# Patient Record
Sex: MALE | Race: WHITE | Employment: FULL TIME | URBAN - METROPOLITAN AREA
[De-identification: names, ages, dates, MRNs, and addresses within clinical notes are randomized per-mention and may not be internally consistent; named-entity substitution may affect disease eponyms.]

---

## 2017-02-23 ENCOUNTER — TRANSFERRED RECORDS (OUTPATIENT)
Dept: HEALTH INFORMATION MANAGEMENT | Facility: CLINIC | Age: 27
End: 2017-02-23

## 2017-02-23 ENCOUNTER — MEDICAL CORRESPONDENCE (OUTPATIENT)
Dept: HEALTH INFORMATION MANAGEMENT | Facility: CLINIC | Age: 27
End: 2017-02-23

## 2017-04-24 ENCOUNTER — MEDICAL CORRESPONDENCE (OUTPATIENT)
Dept: HEALTH INFORMATION MANAGEMENT | Facility: CLINIC | Age: 27
End: 2017-04-24

## 2017-06-12 ENCOUNTER — PRE VISIT (OUTPATIENT)
Dept: NEUROLOGY | Facility: CLINIC | Age: 27
End: 2017-06-12

## 2017-06-12 NOTE — TELEPHONE ENCOUNTER
1.  Date/reason for appt: 6/15/17, L sided cutaneous femoral paresthesia   2.  Referring provider: SAMANTHA KIRAN  3.  Call to patient (Yes / No - short description): No, referred   4.  Previous care at / records requested from:   [facility]- records scanned into epic.

## 2017-06-15 ENCOUNTER — OFFICE VISIT (OUTPATIENT)
Dept: NEUROLOGY | Facility: CLINIC | Age: 27
End: 2017-06-15

## 2017-06-15 VITALS
BODY MASS INDEX: 22.26 KG/M2 | SYSTOLIC BLOOD PRESSURE: 128 MMHG | RESPIRATION RATE: 16 BRPM | HEIGHT: 73 IN | WEIGHT: 168 LBS | OXYGEN SATURATION: 95 % | HEART RATE: 91 BPM | TEMPERATURE: 98.4 F | DIASTOLIC BLOOD PRESSURE: 82 MMHG

## 2017-06-15 DIAGNOSIS — G57.11 MERALGIA PARESTHETICA OF RIGHT SIDE: Primary | ICD-10-CM

## 2017-06-15 ASSESSMENT — PAIN SCALES - GENERAL: PAINLEVEL: NO PAIN (0)

## 2017-06-15 NOTE — MR AVS SNAPSHOT
"              After Visit Summary   6/15/2017    Brandon Kam    MRN: [id number]           Patient Information     Date Of Birth          1990        Visit Information        Provider Department      6/15/2017 2:25 PM Brandon Luna MD [facility] Neurology        Today's Diagnoses     Meralgia paresthetica of right side    -  1       Follow-ups after your visit        Who to contact     Please call your clinic at [phone] to:    Ask questions about your health    Make or cancel appointments    Discuss your medicines    Learn about your test results    Speak to your doctor   If you have compliments or concerns about an experience at your clinic, or if you wish to file a complaint, please contact [facility] Patient Relations at [phone] or email us at [email or web address]         Additional Information About Your Visit        MyChart Information     Schedulicity is an electronic gateway that provides easy, online access to your medical records. With Schedulicity, you can request a clinic appointment, read your test results, renew a prescription or communicate with your care team.     To sign up for SETiTt visit the website at [email or web address]   You will be asked to enter the access code listed below, as well as some personal information. Please follow the directions to create your username and password.     Your access code is: [email or web address]  Expires: 3/30/2018 11:19 PM     Your access code will  in 90 days. If you need help or a new code, please contact your [facility] or call [phone] for assistance.        Care EveryWhere ID     This is your Care EveryWhere ID. This could be used by other organizations to access your [place] medical records  [id number]        Your Vitals Were     Pulse Temperature Respirations Height Pulse Oximetry BMI (Body Mass Index)    91 98.4  F (36.9  C) (Oral) 16 1.842 m (6' 0.5\") 95% 22.47 " kg/m2       Blood Pressure from Last 3 Encounters:   No data found for BP    Weight from Last 3 Encounters:   No data found for Wt              Today, you had the following     No orders found for display       Primary Care Provider Office Phone # Fax #    Carlos Rolon [phone]       [address] [address]        Equal Access to Services     [facility]: Hadii aad ku hadasho Soomaali, waaxda luqadaha, qaybta kaalmada adeegyada, waxay idiin hayaan adeeg kharash la'aan . So [facility] [phone].    ATENCIÓN: Si habla español, tiene a goins disposición servicios gratuitos de asistencia lingüística. Llame al [phone].    We comply with applicable federal civil rights laws and [place] laws. We do not discriminate on the basis of race, color, national origin, age, disability, sex, sexual orientation, or gender identity.            Thank you!     Thank you for choosing [facility] NEUROLOGY  for your care. Our goal is always to provide you with excellent care. Hearing back from our patients is one way we can continue to improve our services. Please take a few minutes to complete the written survey that you may receive in the mail after your visit with us. Thank you!             Your Updated Medication List - Protect others around you: Learn how to safely use, store and throw away your medicines at www.disposemymeds.org.      Notice  As of 6/15/2017 11:59 PM    You have not been prescribed any medications.

## 2017-06-15 NOTE — LETTER
6/15/2017       RE: Brandon Kam  [address] [address]     Dear Colleague,    Thank you for referring your patient, Brandon Kam, to the [facility] NEUROLOGY at [facility]. Please see a copy of my visit note below.    DEPARTMENT OF NEUROLOGY  Referral for: meralgia paresthetica  Patient Name:  Brandon Kam  MRN:  [id number]  :  1990  Date of Clinic Visit:  Larissa 15, 2017  Primary Care Provider:  Carlos Rolon  Provider Requesting Consult: [facility]    CHIEF COMPLAINT: meralgia paresthetica    HISTORY OF PRESENT ILLNESS:  Brandon Kam is a 27 year old male presenting with L thigh sensory changes.  Brandon first noticed numbness in the lateral aspect of the L thigh for past ~18 months that intermittently radiates down to knee. Around this time he had started increasing his hiking and backpacking activity where he would wear a framed backpack loaded with tens of pounds (he mentions up to 40 lbs) that also had a waist strap for extra support. He recalls wearing the waist strap so tightly that he eventually developed a small ulcerated wound on his anterior L thigh that is now well-healed. Initially there was no pain but occasionally now he will have some soreness after exertion. He also has occasional paresthesia in that area. He stopped wearing his backpack for a few weeks and felt that he may have been having some improvement in sensation, but he just went on a trip for 4 days up to [facility] and seems to have exacerbated his symptoms again.  His symptoms seem improved with better sleep. There is possibly an association with posture like prolonged sitting but not lying flat on his back or on his side. There is no associated weakness in his LLE. He has no history of trauma or surgery to that areas. He is not interested in pharmacological treatment at this time, would prefer conservative management.    ASSESSMENT AND PLAN:  Mr. Kam is  "a 27M presenting with meralgia paresthetica. Classic distribution with corroborating history and exam findings. Differential diagnosis is limited but includes lumbar radiculopathy. Radiculopathy usually presents with back pain and the sensory deficit area is not usually so demarcated. He lacks history and findings consistent with a herniation or cord compression syndrome. No prior trauma or surgery that would confound a medical diagnosis.    Plan:  - avoid long-term, heavy-weight bearing over waist area; ensure good fit of backpack and try to increase padding on the waist belt or taking more breaks if you need to wear the pack  - Brandon would like to avoid medications for now, but should he develop pain/paresthesia then we can use the following: Gabapentin, Carbamazepine, or Phenytoin in that order.  - no need for neurological follow-up    Patient was seen and discussed with Dr. Liang.    Brandon Luna MD  Neurology PGY2  [facility]  Pager: [phone]    PHYSICAL EXAMINATION:  Vitals: /82 (BP Location: Right arm, Patient Position: Chair, Cuff Size: Adult Large)  Pulse 91  Temp 98.4  F (36.9  C) (Oral)  Resp 16  Ht 1.842 m (6' 0.5\")  Wt 76.2 kg (168 lb)  SpO2 95%  BMI 22.47 kg/m2  General: sitting in chair, NAD  HEENT: normocephalic  Cardiac: normotensive  Pulmonary: non-labored on room air  Abdomen: soft, NT/ND  Extremities: no edema, warm to palpation  Skin: no rashes, no bruising/bleeding  Psych: cooperative, appropriate  Neuro:   Mental status: alert and oriented to person, place, and time; language is fluent with intact comprehension and naming   Cranial nerves: PERRL, EOMI with intact smooth pursuit, full visual fields, fundi within normal limits, no facial asymmetry or ptosis, facial sensation intact and symmetric, hearing intact to conversation, tongue and uvula are midline, no hypophonia, no dysarthria   Motor: No abnormal posture, tone, atrophy, or " movements/fasciculations.   RIGHT LEFT    5 5   Biceps 5 5   Triceps 5 5   Deltoid 5 5   Hip flexion 5 5   Knee extension 5 5   Knee flexion 5 5   Ankle flexion 5 5   Ankle extension 5 5    Reflexes: did not test Babinski. Did not test Concepcion.   RIGHT LEFT   Brachioradialis 2+ 2+   Biceps 2+ 2+   Patellar 2+ 2+   Achilles 2+ 2+    Sensory: Intact to light touch, pin prick, and temperature in all extremities without extinction except for a small ~3l8xwad area on the anteolateral aspect of the L thigh about midway to the knee that demonstrates mildly hypoesthetic LT sensation and mildly hyperesthetic pain/temperature.   Coordination: No dysmetria. No dysdiadochokinesia.   Gait: Normal width, stride length, turn, and arm swing.    INVESTIGATIONS:   None at this time    REVIEW OF SYSTEMS: 12-point RoS negative except as per HPI.    Allergies:  Allergies not on file     Current Medications:  No current outpatient prescriptions on file.     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     Social History Main Topics     Smoking status: Not on file     Smokeless tobacco: Not on file     Alcohol use Not on file     Drug use: Not on file     Sexual activity: Not on file     Other Topics Concern     Not on file     Social History Narrative       Again, thank you for allowing me to participate in the care of your patient.      Sincerely,    Brandon Luna MD

## 2017-06-15 NOTE — LETTER
6/15/2017      RE: Brandon Kam  [address] [address]         DEPARTMENT OF NEUROLOGY  Referral for: meralgia paresthetica  Patient Name:  Brandon Kam  MRN:  [id number]  :  1990  Date of Clinic Visit:  Larissa 15, 2017  Primary Care Provider:  Carlos Rolon  Provider Requesting Consult: [facility]    CHIEF COMPLAINT: meralgia paresthetica    HISTORY OF PRESENT ILLNESS:  Brandon Kam is a 27 year old male presenting with L thigh sensory changes.  Brandon first noticed numbness in the lateral aspect of the L thigh for past ~18 months that intermittently radiates down to knee. Around this time he had started increasing his hiking and backpacking activity where he would wear a framed backpack loaded with tens of pounds (he mentions up to 40 lbs) that also had a waist strap for extra support. He recalls wearing the waist strap so tightly that he eventually developed a small ulcerated wound on his anterior L thigh that is now well-healed. Initially there was no pain but occasionally now he will have some soreness after exertion. He also has occasional paresthesia in that area. He stopped wearing his backpack for a few weeks and felt that he may have been having some improvement in sensation, but he just went on a trip for 4 days up to [facility] and seems to have exacerbated his symptoms again.  His symptoms seem improved with better sleep. There is possibly an association with posture like prolonged sitting but not lying flat on his back or on his side. There is no associated weakness in his LLE. He has no history of trauma or surgery to that areas. He is not interested in pharmacological treatment at this time, would prefer conservative management.    ASSESSMENT AND PLAN:  Mr. Kam is a 27M presenting with meralgia paresthetica. Classic distribution with corroborating history and exam findings. Differential diagnosis is limited but includes lumbar radiculopathy. Radiculopathy  "usually presents with back pain and the sensory deficit area is not usually so demarcated. He lacks history and findings consistent with a herniation or cord compression syndrome. No prior trauma or surgery that would confound a medical diagnosis.    Plan:  - avoid long-term, heavy-weight bearing over waist area; ensure good fit of backpack and try to increase padding on the waist belt or taking more breaks if you need to wear the pack  - Brandon would like to avoid medications for now, but should he develop pain/paresthesia then we can use the following: Gabapentin, Carbamazepine, or Phenytoin, in that order  - no need for neurological follow-up    Patient was seen and discussed with Dr. Liang.    Brandon Luna MD  Neurology PGY2  [facility]  Pager: [phone]    PHYSICAL EXAMINATION:  Vitals: /82 (BP Location: Right arm, Patient Position: Chair, Cuff Size: Adult Large)  Pulse 91  Temp 98.4  F (36.9  C) (Oral)  Resp 16  Ht 1.842 m (6' 0.5\")  Wt 76.2 kg (168 lb)  SpO2 95%  BMI 22.47 kg/m2  General: sitting in chair, NAD  HEENT: normocephalic  Cardiac: normotensive  Pulmonary: non-labored on room air  Abdomen: soft, NT/ND  Extremities: no edema, warm to palpation  Skin: no rashes, no bruising/bleeding  Psych: cooperative, appropriate  Neuro:   Mental status: alert and oriented to person, place, and time; language is fluent with intact comprehension and naming   Cranial nerves: PERRL, EOMI with intact smooth pursuit, full visual fields, fundi within normal limits, no facial asymmetry or ptosis, facial sensation intact and symmetric, hearing intact to conversation, tongue and uvula are midline, no hypophonia, no dysarthria   Motor: No abnormal posture, tone, atrophy, or movements/fasciculations.   RIGHT LEFT    5 5   Biceps 5 5   Triceps 5 5   Deltoid 5 5   Hip flexion 5 5   Knee extension 5 5   Knee flexion 5 5   Ankle flexion 5 5   Ankle extension 5 5    Reflexes: did not test " Babinski. Did not test Concepcion.   RIGHT LEFT   Brachioradialis 2+ 2+   Biceps 2+ 2+   Patellar 2+ 2+   Achilles 2+ 2+    Sensory: Intact to light touch, pin prick, and temperature in all extremities without extinction except for a small ~8t9mhum area on the anteolateral aspect of the L thigh about midway to the knee that demonstrates mildly hypoesthetic LT sensation and mildly hyperesthetic pain/temperature.   Coordination: No dysmetria. No dysdiadochokinesia.   Gait: Normal width, stride length, turn, and arm swing.    INVESTIGATIONS:   None at this time    REVIEW OF SYSTEMS: 12-point RoS negative except as per HPI.    Allergies:  Allergies not on file     Current Medications:  No current outpatient prescriptions on file.     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     Social History Main Topics     Smoking status: Not on file     Smokeless tobacco: Not on file     Alcohol use Not on file     Drug use: Not on file     Sexual activity: Not on file     Other Topics Concern     Not on file     Social History Narrative       Brandon Luna MD

## 2017-06-15 NOTE — PROGRESS NOTES
DEPARTMENT OF NEUROLOGY  Referral for: meralgia paresthetica  Patient Name:  Brandon Kam  MRN:  [id number]  :  1990  Date of Clinic Visit:  Larissa 15, 2017  Primary Care Provider:  Carlos Rolon  Provider Requesting Consult: [facility]    CHIEF COMPLAINT: meralgia paresthetica    HISTORY OF PRESENT ILLNESS:  Brandon Kam is a 27 year old male presenting with L thigh sensory changes.  Brandon first noticed numbness in the lateral aspect of the L thigh for past ~18 months that intermittently radiates down to knee. Around this time he had started increasing his hiking and backpacking activity where he would wear a framed backpack loaded with tens of pounds (he mentions up to 40 lbs) that also had a waist strap for extra support. He recalls wearing the waist strap so tightly that he eventually developed a small ulcerated wound on his anterior L thigh that is now well-healed. Initially there was no pain but occasionally now he will have some soreness after exertion. He also has occasional paresthesia in that area. He stopped wearing his backpack for a few weeks and felt that he may have been having some improvement in sensation, but he just went on a trip for 4 days up to [facility] and seems to have exacerbated his symptoms again.  His symptoms seem improved with better sleep. There is possibly an association with posture like prolonged sitting but not lying flat on his back or on his side. There is no associated weakness in his LLE. He has no history of trauma or surgery to that areas. He is not interested in pharmacological treatment at this time, would prefer conservative management.    ASSESSMENT AND PLAN:  Mr. Kam is a 27M presenting with meralgia paresthetica. Classic distribution with corroborating history and exam findings. Differential diagnosis is limited but includes lumbar radiculopathy. Radiculopathy usually presents with back pain and the sensory deficit area is not usually so  "demarcated. He lacks history and findings consistent with a herniation or cord compression syndrome. No prior trauma or surgery that would confound a medical diagnosis.    Plan:  - avoid long-term, heavy-weight bearing over waist area; ensure good fit of backpack and try to increase padding on the waist belt or taking more breaks if you need to wear the pack  - Brandon would like to avoid medications for now, but should he develop pain/paresthesia then we can use the following: Gabapentin, Carbamazepine, or Phenytoin, in that order  - no need for neurological follow-up    Patient was seen and discussed with Dr. Liang.    Brandon Luna MD  Neurology PGY2  [facility]  Pager: [phone]    PHYSICAL EXAMINATION:  Vitals: /82 (BP Location: Right arm, Patient Position: Chair, Cuff Size: Adult Large)  Pulse 91  Temp 98.4  F (36.9  C) (Oral)  Resp 16  Ht 1.842 m (6' 0.5\")  Wt 76.2 kg (168 lb)  SpO2 95%  BMI 22.47 kg/m2  General: sitting in chair, NAD  HEENT: normocephalic  Cardiac: normotensive  Pulmonary: non-labored on room air  Abdomen: soft, NT/ND  Extremities: no edema, warm to palpation  Skin: no rashes, no bruising/bleeding  Psych: cooperative, appropriate  Neuro:   Mental status: alert and oriented to person, place, and time; language is fluent with intact comprehension and naming   Cranial nerves: PERRL, EOMI with intact smooth pursuit, full visual fields, fundi within normal limits, no facial asymmetry or ptosis, facial sensation intact and symmetric, hearing intact to conversation, tongue and uvula are midline, no hypophonia, no dysarthria   Motor: No abnormal posture, tone, atrophy, or movements/fasciculations.   RIGHT LEFT    5 5   Biceps 5 5   Triceps 5 5   Deltoid 5 5   Hip flexion 5 5   Knee extension 5 5   Knee flexion 5 5   Ankle flexion 5 5   Ankle extension 5 5    Reflexes: did not test Babinski. Did not test Concepcion.   RIGHT LEFT   Brachioradialis 2+ 2+   Biceps " 2+ 2+   Patellar 2+ 2+   Achilles 2+ 2+    Sensory: Intact to light touch, pin prick, and temperature in all extremities without extinction except for a small ~3d8gzoh area on the anteolateral aspect of the L thigh about midway to the knee that demonstrates mildly hypoesthetic LT sensation and mildly hyperesthetic pain/temperature.   Coordination: No dysmetria. No dysdiadochokinesia.   Gait: Normal width, stride length, turn, and arm swing.    INVESTIGATIONS:   None at this time    REVIEW OF SYSTEMS: 12-point RoS negative except as per HPI.    Allergies:  Allergies not on file     Current Medications:  No current outpatient prescriptions on file.     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     Social History Main Topics     Smoking status: Not on file     Smokeless tobacco: Not on file     Alcohol use Not on file     Drug use: Not on file     Sexual activity: Not on file     Other Topics Concern     Not on file     Social History Narrative

## 2017-06-15 NOTE — NURSING NOTE
Chief Complaint   Patient presents with     Consult     [facility]- NUMBNESS IN LLE     Porter Laura, CMA

## 2022-10-11 ENCOUNTER — TRANSFERRED RECORDS (OUTPATIENT)
Dept: HEALTH INFORMATION MANAGEMENT | Facility: CLINIC | Age: 32
End: 2022-10-11

## 2022-10-14 ENCOUNTER — HOSPITAL ENCOUNTER (OUTPATIENT)
Dept: MRI IMAGING | Facility: CLINIC | Age: 32
Discharge: HOME | End: 2022-10-14
Attending: FAMILY MEDICINE | Admitting: FAMILY MEDICINE
Payer: COMMERCIAL

## 2022-10-14 DIAGNOSIS — M54.50 LOW BACK PAIN: ICD-10-CM

## 2022-10-14 PROCEDURE — 72148 MRI LUMBAR SPINE W/O DYE: CPT

## 2022-10-14 PROCEDURE — 72148 MRI LUMBAR SPINE W/O DYE: CPT | Mod: 26 | Performed by: RADIOLOGY

## 2022-10-20 ENCOUNTER — MEDICAL CORRESPONDENCE (OUTPATIENT)
Dept: HEALTH INFORMATION MANAGEMENT | Facility: CLINIC | Age: 32
End: 2022-10-20

## 2022-10-21 ENCOUNTER — TRANSFERRED RECORDS (OUTPATIENT)
Dept: HEALTH INFORMATION MANAGEMENT | Facility: CLINIC | Age: 32
End: 2022-10-21

## 2022-10-25 ENCOUNTER — TRANSCRIBE ORDERS (OUTPATIENT)
Dept: OTHER | Age: 32
End: 2022-10-25

## 2022-10-25 DIAGNOSIS — M51.360 DISCOGENIC LUMBAR PAIN: Primary | ICD-10-CM

## 2022-12-08 ENCOUNTER — OFFICE VISIT (OUTPATIENT)
Dept: ANESTHESIOLOGY | Facility: CLINIC | Age: 32
End: 2022-12-08
Attending: FAMILY MEDICINE
Payer: COMMERCIAL

## 2022-12-08 VITALS
OXYGEN SATURATION: 98 % | HEIGHT: 72 IN | BODY MASS INDEX: 22.78 KG/M2 | DIASTOLIC BLOOD PRESSURE: 85 MMHG | HEART RATE: 103 BPM | SYSTOLIC BLOOD PRESSURE: 123 MMHG

## 2022-12-08 DIAGNOSIS — M51.360 DISCOGENIC LUMBAR PAIN: ICD-10-CM

## 2022-12-08 DIAGNOSIS — G89.29 CHRONIC LEFT-SIDED LOW BACK PAIN WITH LEFT-SIDED SCIATICA: Primary | ICD-10-CM

## 2022-12-08 DIAGNOSIS — M54.42 CHRONIC LEFT-SIDED LOW BACK PAIN WITH LEFT-SIDED SCIATICA: Primary | ICD-10-CM

## 2022-12-08 PROCEDURE — 99203 OFFICE O/P NEW LOW 30 MIN: CPT | Mod: GC | Performed by: ANESTHESIOLOGY

## 2022-12-08 RX ORDER — LIDOCAINE 4 G/G
1 PATCH TOPICAL EVERY 24 HOURS
Qty: 30 PATCH | Refills: 3 | Status: SHIPPED | OUTPATIENT
Start: 2022-12-08

## 2022-12-08 ASSESSMENT — PAIN SCALES - GENERAL: PAINLEVEL: MILD PAIN (2)

## 2022-12-08 NOTE — PROGRESS NOTES
MELANY reviewed AVS with patient. Patient to contact clinic if as questions or concerns, patient verbalized understanding      Mireya Lopez CMA

## 2022-12-08 NOTE — LETTER
12/8/2022       RE: Brandon Kam  [address] [facility] [address]     Dear Colleague,    Thank you for referring your patient, Brandon [email or web address], to the [facility] [place] at [facility]. Please see a copy of my visit note below.                          [facility] Consultation    Date of visit: 12/8/2022    Reason for consultation:    Brandon Kam is a 32 year old male who is seen in consultation today at the request of his provider, Les Torres MD.    Primary Care Provider is Carlos Rolon.  Pain medications are being prescribed by PCP.    Please see the [facility] health questionnaire which the patient completed and reviewed with me in detail.    Chief Complaint:    No chief complaint on file.      Pain history:  Brandon Kam is a 32 year old male who first started having problems with pain in 2019. Reports a low back pain that radiates to left posterior upper thigh and to the lateral aspect of the left knee, 5/10 in intensity that comes and goes, associated with tingling in his left foot and lower back stiffness that does not last for more than an hour. First noticed the pain after a basketball game. His symptoms seem improved with better sleep. There is possibly an association with posture like prolonged sitting but not lying flat on his back or on his side. He has been trying PT with actually good relieve. He takes ibuprofen sporadically and it seems to help. MRI lumbar spine October 2022 showing discogenic changes L5 and S1.    Pain rating: intensity ranges from 4/10 to 7/10, and Averages 5/10 on a 0-10 scale.  Aggravating factors include: prolonged sitting   Relieving factors include:  lying flat on his back or on his side.   Any bowel or bladder incontinence: none    Pain medication:    Ibuprofen sporadically    Other treatments have  included:  Brandon Kam has not been seen at a pain clinic in the past.  PT: yes  Acupuncture: none  TENs Unit: none  Injections: none    Past Medical History:  No past medical history on file.  There are no problems to display for this patient.      Past Surgical History:  No past surgical history on file.  Medications:  Current Outpatient Medications   Medication Sig Dispense Refill     Lidocaine (LIDOCARE) 4 % Patch Place 1 patch onto the skin every 24 hours To prevent lidocaine toxicity, patient should be patch free for 12 hrs daily. 30 patch 3     Allergies:   No Known Allergies    Family history:  No family history on file.        Physical Exam:  Vitals:    12/08/22 0750   BP: 123/85   BP Location: Right arm   Patient Position: Chair   Cuff Size: Adult Large   Pulse: 103   SpO2: 98%   Height: 1.829 m (6')     Exam:    Musculoskeletal exam:  Gait/Station/Posture: normal  Myofascial tenderness:  none  Straight leg exam: positive left side    Neurologic exam:  CN:  Cranial nerves 2-12 are normal  Motor:  5/5 UE and LE strength    Sensory:  ( lower extremities):   Light touch: normal    Allodynia: absent   Dysethesia: absent    Hyperalgesia: absent     Diagnostic tests:  MRI of lumbar spine was completed on 10/14/22 showing:    Impression: Multilevel lumbar spondylosis detailed above most apparent  at L4-5 and L5-S1. At L5-S1 there is moderate bilateral neural  foraminal stenosis with some impingement of the exiting L5 nerves.  There is also displacement and impingement of the left greater than  right descending left S1 nerve roots in the lateral recess.    Assessment:  1. Lumbar radiculopathy with a possible component of myofascial pain    Brandon Kam is a 32 year old male who presents with the complaints of a low back pain that radiates to left posterior upper thigh and  to the lateral aspect of the left knee, 5/10 in intensity that comes and goes, associated with tingling in his left foot. He only  takes ibuprofen sporadically or during PT sessions. States his pain is improving with physical therapy and he would like to continue it. Had a thorough conversation about medications and steroid injections but he does not want to pursue those therapies at this point but maybe later on if pain gets worse. He is agreeable to use lidocaine patch.    Plan:  Diagnosis reviewed, treatment option addressed, and risk/benefits discussed.  Self-care instructions given.  I am recommending a multidisciplinary treatment plan to help this patient better manage his pain.      1. Physical Therapy: continue current therapy regimen as tolerated  2. Diagnostic Studies: none  3. Medication Management: lidocaine patch and ibuprofen as needed  4. Further procedures recommended: not at this time  5. Other treatments:  6. Follow up: as needed    Total time spent was 45 minutes, and more than 50% of face to face time was spent in counseling and/or coordination of care regarding principles of multidisciplinary care, medication management.    Renaldo Schmitt Junior, MD  Anesthesiology resident, PGY3  [facility]    I saw and examined the patient with the Pain Fellow/Resident. I have reviewed and agree with the resident's note and plan of care and made changes and corrections directly to the body of the note.    TIME SPENT:  BY FELLOW/RESIDENT ALONE 20 MIN  BY MYSELF AND FELLOW/RESIDENT TOGETHER 25 MIN    This time includes time for chart review, preparation, and documentation.     Vickie Edmond MD  Pain Medicine, Department of Anesthesiology  , [facility]        MELANY reviewed AVS with patient. Patient to contact clinic if as questions or concerns, patient verbalized understanding      Mireya Lopez CMA       Sincerely,    Vickie Edmond MD

## 2022-12-08 NOTE — PATIENT INSTRUCTIONS
Medications:Lidocaine (LIDOCARE) 4 % Patch     Lidocaine (LIDOCARE) 4 % Patch               Place 1 patch onto the skin every 24 hours To prevent lidocaine toxicity, patient should be patch free for 12 hrs daily.  Disp-30 patch, R-3  E-Prescribe             *Please provide the clinic with a minium of 1 week notice, on all prescription refills.     Referrals:    Physical Therapy Referral    Physical Therapy Referral placed-   If you have not heard from the scheduling office within 2 business days, please call [phone] for all locations         Recommended Follow up:      As Needed       Please call [phone] to schedule your clinic appointment if you don't already have an appointment scheduled.        To speak with a nurse, schedule/reschedule/cancel a clinic appointment, or request a medication refill call: [phone]    You can also reach us by [employer]: [email or web address]

## 2022-12-08 NOTE — PROGRESS NOTES
[facility] Consultation    Date of visit: 12/8/2022    Reason for consultation:    Brandon Kam is a 32 year old male who is seen in consultation today at the request of his provider, Les Torres MD.    Primary Care Provider is Carlos Rolon.  Pain medications are being prescribed by PCP.    Please see the [facility] health questionnaire which the patient completed and reviewed with me in detail.    Chief Complaint:    No chief complaint on file.      Pain history:  Brandon Kam is a 32 year old male who first started having problems with pain in 2019. Reports a low back pain that radiates to left posterior upper thigh and to the lateral aspect of the left knee, 5/10 in intensity that comes and goes, associated with tingling in his left foot and lower back stiffness that does not last for more than an hour. First noticed the pain after a basketball game. His symptoms seem improved with better sleep. There is possibly an association with posture like prolonged sitting but not lying flat on his back or on his side. He has been trying PT with actually good relieve. He takes ibuprofen sporadically and it seems to help. MRI lumbar spine October 2022 showing discogenic changes L5 and S1.    Pain rating: intensity ranges from 4/10 to 7/10, and Averages 5/10 on a 0-10 scale.  Aggravating factors include: prolonged sitting   Relieving factors include:  lying flat on his back or on his side.   Any bowel or bladder incontinence: none    Pain medication:    Ibuprofen sporadically    Other treatments have included:  Brandon Kam has not been seen at a pain clinic in the past.  PT: yes  Acupuncture: none  TENs Unit: none  Injections: none    Past Medical History:  No past medical history on file.  There are no problems to display for this patient.      Past Surgical History:  No past surgical history on file.  Medications:  Current Outpatient  Medications   Medication Sig Dispense Refill     Lidocaine (LIDOCARE) 4 % Patch Place 1 patch onto the skin every 24 hours To prevent lidocaine toxicity, patient should be patch free for 12 hrs daily. 30 patch 3     Allergies:   No Known Allergies    Family history:  No family history on file.        Physical Exam:  Vitals:    12/08/22 0750   BP: 123/85   BP Location: Right arm   Patient Position: Chair   Cuff Size: Adult Large   Pulse: 103   SpO2: 98%   Height: 1.829 m (6')     Exam:    Musculoskeletal exam:  Gait/Station/Posture: normal  Myofascial tenderness:  none  Straight leg exam: positive left side    Neurologic exam:  CN:  Cranial nerves 2-12 are normal  Motor:  5/5 UE and LE strength    Sensory:  ( lower extremities):   Light touch: normal    Allodynia: absent   Dysethesia: absent    Hyperalgesia: absent     Diagnostic tests:  MRI of lumbar spine was completed on 10/14/22 showing:    Impression: Multilevel lumbar spondylosis detailed above most apparent  at L4-5 and L5-S1. At L5-S1 there is moderate bilateral neural  foraminal stenosis with some impingement of the exiting L5 nerves.  There is also displacement and impingement of the left greater than  right descending left S1 nerve roots in the lateral recess.    Assessment:  1. Lumbar radiculopathy with a possible component of myofascial pain    Brandon Kam is a 32 year old male who presents with the complaints of a low back pain that radiates to left posterior upper thigh and  to the lateral aspect of the left knee, 5/10 in intensity that comes and goes, associated with tingling in his left foot. He only takes ibuprofen sporadically or during PT sessions. States his pain is improving with physical therapy and he would like to continue it. Had a thorough conversation about medications and steroid injections but he does not want to pursue those therapies at this point but maybe later on if pain gets worse. He is agreeable to use lidocaine  patch.    Plan:  Diagnosis reviewed, treatment option addressed, and risk/benefits discussed.  Self-care instructions given.  I am recommending a multidisciplinary treatment plan to help this patient better manage his pain.      1. Physical Therapy: continue current therapy regimen as tolerated  2. Diagnostic Studies: none  3. Medication Management: lidocaine patch and ibuprofen as needed  4. Further procedures recommended: not at this time  5. Other treatments:  6. Follow up: as needed    Total time spent was 45 minutes, and more than 50% of face to face time was spent in counseling and/or coordination of care regarding principles of multidisciplinary care, medication management.    Renaldo Schmitt Junior, MD  Anesthesiology resident, PGY3  [facility]    I saw and examined the patient with the Pain Fellow/Resident. I have reviewed and agree with the resident's note and plan of care and made changes and corrections directly to the body of the note.    TIME SPENT:  BY FELLOW/RESIDENT ALONE 20 MIN  BY MYSELF AND FELLOW/RESIDENT TOGETHER 25 MIN    This time includes time for chart review, preparation, and documentation.     Vickie Edmond MD  Pain Medicine, Department of Anesthesiology  , [facility]

## 2022-12-08 NOTE — NURSING NOTE
Patient presents with:  Consult: Consult Lower back, left leg pain      Mild Pain (2)         What medications are you using for pain? Nothing    (New patients only) Have you been seen by another pain clinic/ provider? no    (Return Patients only) What refills are you needing today? No    Expectations Just to be able to pin point if its a nerve or if there anything that he's doning wrong or what can it better

## 2022-12-23 NOTE — PROGRESS NOTES
Physical Therapy Initial Evaluation  12/23/2022     Precautions/Restrictions/MD instructions: eval and treat    Therapist Assessment: Brandon Kam is a 32 year old male patient presenting to Physical Therapy with chronic low back pain. Patient demonstrates limited lumbar ROM, + radicular signs and extension directional preference. These impairments limit their ability to sit, bend forward and don/doff shoes without pain. Skilled PT services are necessary in order to reduce impairments and improve independent function.    SUBJECTIVE    Chief Complaint: Low back pain  Associated symptoms: none  Paresthesia (yes/no):  Tingling in left foot occasionally  Onset date: 2019  Symptoms commenced as a result of: Playing basketball    Pain severity: 1/10 current, 1/10 worst  Location of pain: radiates to L posterior thigh and stops at the knee  Quality of Pain: achy, stiffness   Better: new PT stretches   Worse:  sitting, strengthening exercises, bending forward, donning/doffing shoes  Time of day: activity/position related  Progression of Symptoms since onset:  Since onset, these symptoms are improving  Previous treatment: has included PT (focused on hamstring); effect was poor  Previous Functional Status:  fully functional prior to pain onset/injury.        General health as reported by patient: good.    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, standing, computer work  Current exercise regimen/Recreational activities: planks, body weight strength training  Barriers to treatment: none        Patient's Goal(s): learn new exercises to do to progress rehabilitation    OBJECTIVE    Posture:   Sitting:  Posterior pelvic tilted  Standing: decreased lordosis    Movement Loss:   Edwin Mod Min Nil Pain   Flexion   x  Tightness in hamstrings   Extension   x     Side Gliding R    x    Side Gliding L   x  L leg      Neural Tension:   Slump: + L  SLR: + L  Motor deficit:  5/5 LE myotomes    Sensory deficit:  Normal to light touch in LE dermatomal pattern      Flexibility:   Hamstrings   Left moderate   Right moderate       Tested Movements  Directional Preference: trial extension  Repeated Extension Test: increased ROM, decreased symptoms    Joint mobility  Hypomobile L3-4 with PA mobs    ASSESSMENT/PLAN  Patient is a 32 year old male with lumbar complaints.    Patient has the following significant findings with corresponding treatment plan.                Diagnosis 1:  Low back pain    Pain -  hot/cold therapy, manual therapy, splint/taping/bracing/orthotics, self management, education, directional preference exercise and home program  Decreased ROM/flexibility - manual therapy, therapeutic exercise, therapeutic activity and home program  Decreased joint mobility - manual therapy, therapeutic exercise and home program  Decreased strength - therapeutic exercise, therapeutic activities and home program    Therapy Evaluation Codes:   1) History comprised of:   Personal factors that impact the plan of care:      None.    Comorbidity factors that impact the plan of care are:      None.     Medications impacting care: None.  2) Examination of Body Systems comprised of:   Body structures and functions that impact the plan of care:      Lumbar spine.   Activity limitations that impact the plan of care are:      Bending, Dressing and Sitting.  3) Clinical presentation characteristics are:   Stable/Uncomplicated.  4) Decision-Making    Low complexity using standardized patient assessment instrument and/or measureable assessment of functional outcome.  Cumulative Therapy Evaluation is: Low complexity.    Previous and current functional limitations:  (See Goal Flow Sheet for this information)    Short term and Long term goals: (See Goal Flow Sheet for this information)     Communication ability:  Patient appears to be able to clearly  communicate and understand verbal and written communication and follow directions correctly.  Treatment Explanation - The following has been discussed with the patient:   RX ordered/plan of care  Anticipated outcomes  Possible risks and side effects  This patient would benefit from PT intervention to resume normal activities.   Rehab potential is good.    Frequency:  2 X a month, once daily  Duration:  for 3 months  Discharge Plan:  Achieve all LTG.  Independent in home treatment program.  Reach maximal therapeutic benefit.    Please refer to the daily flowsheet for treatment today, total treatment time and time spent performing 1:1 timed codes.

## 2022-12-27 ENCOUNTER — THERAPY VISIT (OUTPATIENT)
Dept: PHYSICAL THERAPY | Facility: CLINIC | Age: 32
End: 2022-12-27
Attending: ANESTHESIOLOGY
Payer: COMMERCIAL

## 2022-12-27 DIAGNOSIS — G89.29 CHRONIC LEFT-SIDED LOW BACK PAIN WITH LEFT-SIDED SCIATICA: ICD-10-CM

## 2022-12-27 DIAGNOSIS — M54.42 CHRONIC LEFT-SIDED LOW BACK PAIN WITH LEFT-SIDED SCIATICA: ICD-10-CM

## 2022-12-27 PROCEDURE — 97161 PT EVAL LOW COMPLEX 20 MIN: CPT | Mod: GP

## 2022-12-27 PROCEDURE — 97110 THERAPEUTIC EXERCISES: CPT | Mod: GP

## 2023-01-10 ENCOUNTER — THERAPY VISIT (OUTPATIENT)
Dept: PHYSICAL THERAPY | Facility: CLINIC | Age: 33
End: 2023-01-10

## 2023-01-10 DIAGNOSIS — G89.29 CHRONIC LEFT-SIDED LOW BACK PAIN WITH LEFT-SIDED SCIATICA: Primary | ICD-10-CM

## 2023-01-10 DIAGNOSIS — M54.42 CHRONIC LEFT-SIDED LOW BACK PAIN WITH LEFT-SIDED SCIATICA: Primary | ICD-10-CM

## 2023-01-10 PROCEDURE — 97530 THERAPEUTIC ACTIVITIES: CPT | Mod: GP

## 2023-01-10 PROCEDURE — 97110 THERAPEUTIC EXERCISES: CPT | Mod: GP

## 2023-01-23 ENCOUNTER — HEALTH MAINTENANCE LETTER (OUTPATIENT)
Age: 33
End: 2023-01-23

## 2023-02-03 ENCOUNTER — THERAPY VISIT (OUTPATIENT)
Dept: PHYSICAL THERAPY | Facility: CLINIC | Age: 33
End: 2023-02-03
Payer: COMMERCIAL

## 2023-02-03 DIAGNOSIS — G89.29 CHRONIC LEFT-SIDED LOW BACK PAIN WITH LEFT-SIDED SCIATICA: Primary | ICD-10-CM

## 2023-02-03 DIAGNOSIS — M54.42 CHRONIC LEFT-SIDED LOW BACK PAIN WITH LEFT-SIDED SCIATICA: Primary | ICD-10-CM

## 2023-02-03 PROCEDURE — 97110 THERAPEUTIC EXERCISES: CPT | Mod: GP

## 2023-02-03 PROCEDURE — 97140 MANUAL THERAPY 1/> REGIONS: CPT | Mod: GP

## 2023-03-03 ENCOUNTER — THERAPY VISIT (OUTPATIENT)
Dept: PHYSICAL THERAPY | Facility: CLINIC | Age: 33
End: 2023-03-03
Payer: COMMERCIAL

## 2023-03-03 DIAGNOSIS — G89.29 CHRONIC LEFT-SIDED LOW BACK PAIN WITH LEFT-SIDED SCIATICA: Primary | ICD-10-CM

## 2023-03-03 DIAGNOSIS — M54.42 CHRONIC LEFT-SIDED LOW BACK PAIN WITH LEFT-SIDED SCIATICA: Primary | ICD-10-CM

## 2023-03-03 PROCEDURE — 97530 THERAPEUTIC ACTIVITIES: CPT | Mod: GP

## 2023-03-03 PROCEDURE — 97140 MANUAL THERAPY 1/> REGIONS: CPT | Mod: GP

## 2023-03-03 PROCEDURE — 97110 THERAPEUTIC EXERCISES: CPT | Mod: GP

## 2023-03-24 ENCOUNTER — THERAPY VISIT (OUTPATIENT)
Dept: PHYSICAL THERAPY | Facility: CLINIC | Age: 33
End: 2023-03-24
Payer: COMMERCIAL

## 2023-03-24 DIAGNOSIS — M54.42 CHRONIC LEFT-SIDED LOW BACK PAIN WITH LEFT-SIDED SCIATICA: Primary | ICD-10-CM

## 2023-03-24 DIAGNOSIS — G89.29 CHRONIC LEFT-SIDED LOW BACK PAIN WITH LEFT-SIDED SCIATICA: Primary | ICD-10-CM

## 2023-03-24 PROCEDURE — 97530 THERAPEUTIC ACTIVITIES: CPT | Mod: GP

## 2023-03-24 PROCEDURE — 97112 NEUROMUSCULAR REEDUCATION: CPT | Mod: GP

## 2024-02-24 ENCOUNTER — HEALTH MAINTENANCE LETTER (OUTPATIENT)
Age: 34
End: 2024-02-24

## 2024-09-09 ENCOUNTER — TRANSFERRED RECORDS (OUTPATIENT)
Dept: HEALTH INFORMATION MANAGEMENT | Facility: CLINIC | Age: 34
End: 2024-09-09
Payer: COMMERCIAL

## 2025-03-09 ENCOUNTER — HEALTH MAINTENANCE LETTER (OUTPATIENT)
Age: 35
End: 2025-03-09